# Patient Record
Sex: MALE | Race: WHITE | NOT HISPANIC OR LATINO | URBAN - NONMETROPOLITAN AREA
[De-identification: names, ages, dates, MRNs, and addresses within clinical notes are randomized per-mention and may not be internally consistent; named-entity substitution may affect disease eponyms.]

---

## 2022-09-14 ENCOUNTER — OFFICE VISIT (OUTPATIENT)
Dept: FAMILY MEDICINE | Facility: CLINIC | Age: 27
End: 2022-09-14

## 2022-09-14 VITALS
DIASTOLIC BLOOD PRESSURE: 82 MMHG | BODY MASS INDEX: 22.26 KG/M2 | HEART RATE: 67 BPM | HEIGHT: 71 IN | TEMPERATURE: 97 F | WEIGHT: 159 LBS | OXYGEN SATURATION: 97 % | SYSTOLIC BLOOD PRESSURE: 130 MMHG

## 2022-09-14 DIAGNOSIS — R36.9 ABNORMAL PENILE DISCHARGE: Primary | ICD-10-CM

## 2022-09-14 DIAGNOSIS — Z20.2 EXPOSURE TO STD: ICD-10-CM

## 2022-09-14 PROCEDURE — 87591 CHLAMYDIA/GONORRHOEAE(GC), PCR: ICD-10-PCS | Mod: ,,, | Performed by: CLINICAL MEDICAL LABORATORY

## 2022-09-14 PROCEDURE — 99203 PR OFFICE/OUTPT VISIT, NEW, LEVL III, 30-44 MIN: ICD-10-PCS | Mod: 25,,, | Performed by: NURSE PRACTITIONER

## 2022-09-14 PROCEDURE — 96372 PR INJECTION,THERAP/PROPH/DIAG2ST, IM OR SUBCUT: ICD-10-PCS | Mod: ,,, | Performed by: NURSE PRACTITIONER

## 2022-09-14 PROCEDURE — 96372 THER/PROPH/DIAG INJ SC/IM: CPT | Mod: ,,, | Performed by: NURSE PRACTITIONER

## 2022-09-14 PROCEDURE — 87661 TRICHOMONAS VAGINALIS BY PCR: ICD-10-PCS | Mod: ,,, | Performed by: CLINICAL MEDICAL LABORATORY

## 2022-09-14 PROCEDURE — 99203 OFFICE O/P NEW LOW 30 MIN: CPT | Mod: 25,,, | Performed by: NURSE PRACTITIONER

## 2022-09-14 PROCEDURE — 87661 TRICHOMONAS VAGINALIS AMPLIF: CPT | Mod: ,,, | Performed by: CLINICAL MEDICAL LABORATORY

## 2022-09-14 PROCEDURE — 87491 CHLAMYDIA/GONORRHOEAE(GC), PCR: ICD-10-PCS | Mod: ,,, | Performed by: CLINICAL MEDICAL LABORATORY

## 2022-09-14 PROCEDURE — 87591 N.GONORRHOEAE DNA AMP PROB: CPT | Mod: ,,, | Performed by: CLINICAL MEDICAL LABORATORY

## 2022-09-14 PROCEDURE — 87491 CHLMYD TRACH DNA AMP PROBE: CPT | Mod: ,,, | Performed by: CLINICAL MEDICAL LABORATORY

## 2022-09-14 RX ORDER — CEFTRIAXONE 1 G/1
1 INJECTION, POWDER, FOR SOLUTION INTRAMUSCULAR; INTRAVENOUS
Status: COMPLETED | OUTPATIENT
Start: 2022-09-14 | End: 2022-09-14

## 2022-09-14 RX ORDER — AZITHROMYCIN 500 MG/1
1000 TABLET, FILM COATED ORAL DAILY
Qty: 2 TABLET | Refills: 0 | Status: SHIPPED | OUTPATIENT
Start: 2022-09-14 | End: 2022-09-15

## 2022-09-14 RX ADMIN — CEFTRIAXONE 1 G: 1 INJECTION, POWDER, FOR SOLUTION INTRAMUSCULAR; INTRAVENOUS at 09:09

## 2022-09-14 NOTE — PROGRESS NOTES
[facility] Medicine          Chief Complaint        Chief Complaint   Patient presents with    Exposure to STD             History of Present Illness           Iglesia Baker is a 27 y.o. male with chronic conditions of none who presents today for penile discharge.  Pt is not aware if his recent partner is positive for any STD.  Pt is having greenish penile discharge and burning with urination.  He has recently had unprotected intercourse.  No fever voiced.          Past Medical History:     History reviewed. No pertinent past medical history.          Past Surgical History:      has no past surgical history on file.          Social History:     Social History     Tobacco Use    Smoking status: Every Day     Types: Cigarettes    Smokeless tobacco: Never   Substance Use Topics    Alcohol use: Yes             I personally reviewed all past medical, surgical, and social.           Review of Systems   Constitutional: Negative.    HENT: Negative.     Eyes: Negative.    Respiratory: Negative.     Cardiovascular: Negative.    Gastrointestinal: Negative.    Endocrine: Negative.    Genitourinary:  Positive for penile discharge.   Musculoskeletal: Negative.    Allergic/Immunologic: Negative.    Hematological: Negative.    Psychiatric/Behavioral: Negative.              Medications:     Outpatient Encounter Medications as of 9/14/2022   Medication Sig Dispense Refill    azithromycin (ZITHROMAX) 500 MG tablet Take 2 tablets (1,000 mg total) by mouth once daily. for 1 dose 2 tablet 0     Facility-Administered Encounter Medications as of 9/14/2022   Medication Dose Route Frequency Provider Last Rate Last Admin    [COMPLETED] cefTRIAXone injection 1 g  1 g Intramuscular 1 time in Clinic/HOD Watson Chester NP   1 g at 09/14/22 0908             Allergies:     Review of patient's allergies indicates:   Allergen Reactions    Penicillins              Health Maintenance:       There is no immunization history on file for this patient.   "    Health Maintenance   Topic Date Due    Hepatitis C Screening  Never done    Lipid Panel  Never done    HPV Vaccines (1 - Male 2-dose series) Never done    TETANUS VACCINE  Never done              Physical Exam           Vital Signs  Temp: 97.2 °F (36.2 °C)  Temp src: Oral  Pulse: 67  SpO2: 97 %  BP: 130/82  BP Location: Right arm  Patient Position: Sitting  Height and Weight  Height: 5' 11" (180.3 cm)  Weight: 72.1 kg (159 lb)  BSA (Calculated - sq m): 1.9 sq meters  BMI (Calculated): 22.2  Weight in (lb) to have BMI = 25: 178.9]          Physical Exam  Vitals reviewed.   Constitutional:       General: He is not in acute distress.     Appearance: Normal appearance. He is normal weight.   HENT:      Head: Normocephalic.      Right Ear: External ear normal.      Left Ear: External ear normal.      Nose: Nose normal.      Mouth/Throat:      Pharynx: Oropharynx is clear.   Eyes:      Conjunctiva/sclera: Conjunctivae normal.   Cardiovascular:      Rate and Rhythm: Normal rate.   Pulmonary:      Effort: Pulmonary effort is normal.   Abdominal:      General: Abdomen is flat. There is no distension.      Palpations: Abdomen is soft.   Musculoskeletal:         General: No swelling or tenderness. Normal range of motion.      Cervical back: Neck supple.      Right lower leg: No edema.      Left lower leg: No edema.   Neurological:      General: No focal deficit present.      Mental Status: He is alert and oriented to person, place, and time. Mental status is at baseline.      Cranial Nerves: No cranial nerve deficit.      Sensory: No sensory deficit.      Motor: No weakness.      Gait: Gait normal.   Psychiatric:         Mood and Affect: Mood normal.         Behavior: Behavior normal.         Thought Content: Thought content normal.         Judgment: Judgment normal.              Laboratory:     CBC:            CMP:           Invalid input(s): CREATININ     LIPIDS:            TSH:            A1C:             "     Assessment/Plan          Iglesia Baker is a 27 y.o.male with:           1. Abnormal penile discharge  - cefTRIAXone injection 1 g  - azithromycin (ZITHROMAX) 500 MG tablet; Take 2 tablets (1,000 mg total) by mouth once daily. for 1 dose  Dispense: 2 tablet; Refill: 0  - Chlamydia/GC, PCR; Future  - Trichomonas vaginalis by PCR; Future  - Chlamydia/GC, PCR  - Trichomonas vaginalis by PCR    2. Exposure to STD  - cefTRIAXone injection 1 g  - azithromycin (ZITHROMAX) 500 MG tablet; Take 2 tablets (1,000 mg total) by mouth once daily. for 1 dose  Dispense: 2 tablet; Refill: 0  - Chlamydia/GC, PCR; Future  - Trichomonas vaginalis by PCR; Future  - Chlamydia/GC, PCR  - Trichomonas vaginalis by PCR             Total time spent face-to-face and non-face-to-face coordinating care for this encounter was: 20 min          Chronic conditions status updated as per HPI.  Other than changes above, cont current medications and maintain follow up with specialists.  Return to clinic PRN.          LENA Anne     [facility]

## 2022-09-15 ENCOUNTER — TELEPHONE (OUTPATIENT)
Dept: FAMILY MEDICINE | Facility: CLINIC | Age: 27
End: 2022-09-15

## 2022-09-15 LAB
CHLAMYDIA BY PCR: NEGATIVE
N. GONORRHOEAE (GC) BY PCR: NEGATIVE
TRICHOMONAS NAT: NEGATIVE

## 2022-09-15 NOTE — TELEPHONE ENCOUNTER
----- Message from Watson Chester NP sent at 9/15/2022  7:44 AM CDT -----  Let pt know all his STD tests are negative; if he continues to have the symptoms after taking the medication; return to clinic for further investigation.

## 2022-09-15 NOTE — PROGRESS NOTES
Let pt know all his STD tests are negative; if he continues to have the symptoms after taking the medication; return to clinic for further investigation.

## 2022-09-22 ENCOUNTER — TELEPHONE (OUTPATIENT)
Dept: FAMILY MEDICINE | Facility: CLINIC | Age: 27
End: 2022-09-22